# Patient Record
Sex: FEMALE | Race: WHITE | HISPANIC OR LATINO | URBAN - METROPOLITAN AREA
[De-identification: names, ages, dates, MRNs, and addresses within clinical notes are randomized per-mention and may not be internally consistent; named-entity substitution may affect disease eponyms.]

---

## 2023-12-14 ENCOUNTER — APPOINTMENT (RX ONLY)
Dept: URBAN - METROPOLITAN AREA CLINIC 129 | Facility: CLINIC | Age: 28
Setting detail: DERMATOLOGY
End: 2023-12-14

## 2023-12-14 DIAGNOSIS — L30.8 OTHER SPECIFIED DERMATITIS: ICD-10-CM | Status: RESOLVING

## 2023-12-14 DIAGNOSIS — K13.0 DISEASES OF LIPS: ICD-10-CM

## 2023-12-14 PROCEDURE — ? TREATMENT REGIMEN

## 2023-12-14 PROCEDURE — ? PRESCRIPTION

## 2023-12-14 PROCEDURE — ? COUNSELING

## 2023-12-14 PROCEDURE — 99203 OFFICE O/P NEW LOW 30 MIN: CPT

## 2023-12-14 RX ORDER — TACROLIMUS 1 MG/G
OINTMENT TOPICAL
Qty: 30 | Refills: 2 | Status: ERX | COMMUNITY
Start: 2023-12-14

## 2023-12-14 RX ADMIN — TACROLIMUS: 1 OINTMENT TOPICAL at 00:00

## 2023-12-14 ASSESSMENT — LOCATION ZONE DERM
LOCATION ZONE: FINGER
LOCATION ZONE: LIP

## 2023-12-14 ASSESSMENT — LOCATION SIMPLE DESCRIPTION DERM
LOCATION SIMPLE: LEFT LIP
LOCATION SIMPLE: LEFT RING FINGER

## 2023-12-14 ASSESSMENT — LOCATION DETAILED DESCRIPTION DERM
LOCATION DETAILED: LEFT SUPERIOR VERMILION LIP
LOCATION DETAILED: LEFT INFERIOR VERMILION LIP
LOCATION DETAILED: LEFT RING FINGERTIP

## 2023-12-14 NOTE — HPI: ECZEMA (PATIENT REPORTED)
It was nice to see you in clinic.    # Diabetes   - it's really important that you check your blood sugars at least once a day. Can vary when you do this so we have some that are fasting and some that are non-fasting.   - Check your blood sugars daily. Goals:   - Before Meal/Breakfast Glucose Level (fasting)  mg/dl   - 2 hours After Meal Glucose Level < 180 mg/dl  - Call us if persistently high or having lows  - We talked about meeting with diabetes education  - We'll check your A1c today and be in touch if we need to make medication changes  - medications refilled today  - flu shot administered     - follow up in 3 months for regular wellness visit + diabetes check    # Psychiatry  - we refilled your medications for a few months but it's important that you establish with a new psychiatrist.    Where Is Your Eczema Located?: Hands

## 2023-12-14 NOTE — PROCEDURE: TREATMENT REGIMEN
Detail Level: Zone
Otc Regimen: Will switch to Vanicream for personal products.
Initiate Treatment: Tacrolimus
Plan: Will consider patch testing if no improvement.
Otc Regimen: Vaseline and Vanicream cream to site daily.